# Patient Record
Sex: FEMALE | Race: BLACK OR AFRICAN AMERICAN | NOT HISPANIC OR LATINO | URBAN - METROPOLITAN AREA
[De-identification: names, ages, dates, MRNs, and addresses within clinical notes are randomized per-mention and may not be internally consistent; named-entity substitution may affect disease eponyms.]

---

## 2019-03-15 ENCOUNTER — EMERGENCY (EMERGENCY)
Facility: HOSPITAL | Age: 34
LOS: 1 days | Discharge: HOME | End: 2019-03-15
Attending: EMERGENCY MEDICINE | Admitting: EMERGENCY MEDICINE
Payer: COMMERCIAL

## 2019-03-15 VITALS
HEART RATE: 85 BPM | TEMPERATURE: 99 F | DIASTOLIC BLOOD PRESSURE: 84 MMHG | OXYGEN SATURATION: 100 % | SYSTOLIC BLOOD PRESSURE: 122 MMHG | RESPIRATION RATE: 16 BRPM

## 2019-03-15 DIAGNOSIS — Z98.891 HISTORY OF UTERINE SCAR FROM PREVIOUS SURGERY: Chronic | ICD-10-CM

## 2019-03-15 LAB
ALBUMIN SERPL ELPH-MCNC: 3.7 G/DL — SIGNIFICANT CHANGE UP (ref 3.3–5)
ALP SERPL-CCNC: 52 U/L — SIGNIFICANT CHANGE UP (ref 40–120)
ALT FLD-CCNC: 19 U/L — SIGNIFICANT CHANGE UP (ref 4–33)
ANION GAP SERPL CALC-SCNC: 11 MMO/L — SIGNIFICANT CHANGE UP (ref 7–14)
APTT BLD: 30.8 SEC — SIGNIFICANT CHANGE UP (ref 27.5–36.3)
AST SERPL-CCNC: 17 U/L — SIGNIFICANT CHANGE UP (ref 4–32)
BASOPHILS # BLD AUTO: 0.03 K/UL — SIGNIFICANT CHANGE UP (ref 0–0.2)
BASOPHILS NFR BLD AUTO: 0.4 % — SIGNIFICANT CHANGE UP (ref 0–2)
BILIRUB SERPL-MCNC: < 0.2 MG/DL — LOW (ref 0.2–1.2)
BLD GP AB SCN SERPL QL: NEGATIVE — SIGNIFICANT CHANGE UP
BUN SERPL-MCNC: 10 MG/DL — SIGNIFICANT CHANGE UP (ref 7–23)
CALCIUM SERPL-MCNC: 9.2 MG/DL — SIGNIFICANT CHANGE UP (ref 8.4–10.5)
CHLORIDE SERPL-SCNC: 105 MMOL/L — SIGNIFICANT CHANGE UP (ref 98–107)
CO2 SERPL-SCNC: 22 MMOL/L — SIGNIFICANT CHANGE UP (ref 22–31)
CREAT SERPL-MCNC: 0.77 MG/DL — SIGNIFICANT CHANGE UP (ref 0.5–1.3)
EOSINOPHIL # BLD AUTO: 0.07 K/UL — SIGNIFICANT CHANGE UP (ref 0–0.5)
EOSINOPHIL NFR BLD AUTO: 0.9 % — SIGNIFICANT CHANGE UP (ref 0–6)
GLUCOSE SERPL-MCNC: 90 MG/DL — SIGNIFICANT CHANGE UP (ref 70–99)
HCG SERPL-ACNC: SIGNIFICANT CHANGE UP MIU/ML
HCT VFR BLD CALC: 34 % — LOW (ref 34.5–45)
HGB BLD-MCNC: 9.9 G/DL — LOW (ref 11.5–15.5)
IMM GRANULOCYTES NFR BLD AUTO: 0.4 % — SIGNIFICANT CHANGE UP (ref 0–1.5)
INR BLD: 0.97 — SIGNIFICANT CHANGE UP (ref 0.88–1.17)
LYMPHOCYTES # BLD AUTO: 2.6 K/UL — SIGNIFICANT CHANGE UP (ref 1–3.3)
LYMPHOCYTES # BLD AUTO: 32 % — SIGNIFICANT CHANGE UP (ref 13–44)
MCHC RBC-ENTMCNC: 21.9 PG — LOW (ref 27–34)
MCHC RBC-ENTMCNC: 29.1 % — LOW (ref 32–36)
MCV RBC AUTO: 75.2 FL — LOW (ref 80–100)
MONOCYTES # BLD AUTO: 0.9 K/UL — SIGNIFICANT CHANGE UP (ref 0–0.9)
MONOCYTES NFR BLD AUTO: 11.1 % — SIGNIFICANT CHANGE UP (ref 2–14)
NEUTROPHILS # BLD AUTO: 4.49 K/UL — SIGNIFICANT CHANGE UP (ref 1.8–7.4)
NEUTROPHILS NFR BLD AUTO: 55.2 % — SIGNIFICANT CHANGE UP (ref 43–77)
NRBC # FLD: 0 K/UL — LOW (ref 25–125)
PLATELET # BLD AUTO: 240 K/UL — SIGNIFICANT CHANGE UP (ref 150–400)
PMV BLD: 10.9 FL — SIGNIFICANT CHANGE UP (ref 7–13)
POTASSIUM SERPL-MCNC: 3.9 MMOL/L — SIGNIFICANT CHANGE UP (ref 3.5–5.3)
POTASSIUM SERPL-SCNC: 3.9 MMOL/L — SIGNIFICANT CHANGE UP (ref 3.5–5.3)
PROT SERPL-MCNC: 6.7 G/DL — SIGNIFICANT CHANGE UP (ref 6–8.3)
PROTHROM AB SERPL-ACNC: 11 SEC — SIGNIFICANT CHANGE UP (ref 9.8–13.1)
RBC # BLD: 4.52 M/UL — SIGNIFICANT CHANGE UP (ref 3.8–5.2)
RBC # FLD: 16.4 % — HIGH (ref 10.3–14.5)
RH IG SCN BLD-IMP: POSITIVE — SIGNIFICANT CHANGE UP
SODIUM SERPL-SCNC: 138 MMOL/L — SIGNIFICANT CHANGE UP (ref 135–145)
WBC # BLD: 8.12 K/UL — SIGNIFICANT CHANGE UP (ref 3.8–10.5)
WBC # FLD AUTO: 8.12 K/UL — SIGNIFICANT CHANGE UP (ref 3.8–10.5)

## 2019-03-15 PROCEDURE — 99284 EMERGENCY DEPT VISIT MOD MDM: CPT

## 2019-03-15 PROCEDURE — 76830 TRANSVAGINAL US NON-OB: CPT | Mod: 26

## 2019-03-15 RX ORDER — SODIUM CHLORIDE 9 MG/ML
1000 INJECTION INTRAMUSCULAR; INTRAVENOUS; SUBCUTANEOUS ONCE
Qty: 0 | Refills: 0 | Status: COMPLETED | OUTPATIENT
Start: 2019-03-15 | End: 2019-03-15

## 2019-03-15 RX ADMIN — SODIUM CHLORIDE 1000 MILLILITER(S): 9 INJECTION INTRAMUSCULAR; INTRAVENOUS; SUBCUTANEOUS at 20:03

## 2019-03-15 NOTE — ED PROVIDER NOTE - CLINICAL SUMMARY MEDICAL DECISION MAKING FREE TEXT BOX
34 year old female 4  weeks gestation via IVF (by LMP 19) with a PMHx of iron def anemia PSHx of  pw intermittent lower pelvic cramping/back pain since onset of pregnancy - worse 1 week ago.   Plan: tvus, hcg, labs, IVF

## 2019-03-15 NOTE — ED ADULT TRIAGE NOTE - CHIEF COMPLAINT QUOTE
pt amb to triage r/o ectopic preg, IVF patient conceived date 2/16, US yesterday w/ no IUP, pelvic pain 1 week ago, denies cramping/pelvic pain on presentation, denies vaginal bleeding, HCG 10,000 yesterday

## 2019-03-15 NOTE — ED PROVIDER NOTE - PROGRESS NOTE DETAILS
MARCE Marquez: Pt signed out to me by MARCE Tinsley, pt was sent in to ED by IVF doctor for r/o ectopic as her he had elevated hcg without IUP. TVUS showing IUP at 6 weeks gestation. Discussed results with pt, will d/c home. admin pa to follow ua. Pt will follow up with her OBGYN.

## 2019-03-15 NOTE — ED PROVIDER NOTE - NSFOLLOWUPINSTRUCTIONS_ED_ALL_ED_FT
Follow up with your OBGYN within 1 week  Pelvic rest, do not insert anything inside the vagina  Return to the ER with any worsening or concerning symptoms, abdominal pain, heavy vaginal bleeding, weakness, fever/chills or any other concerns.

## 2019-03-15 NOTE — ED PROVIDER NOTE - OBJECTIVE STATEMENT
34 year old female 4  weeks gestation via IVF (by LMP 19) with a PMHx of iron def anemia PSHx of  pw intermittent lower pelvic cramping/back pain since onset of pregnancy - worse 1 week ago. States she has occasional sharp pelvic pain and has had vaginal spotting a few times during pregnancy. She had an US which showed no IUP and had a beta of 10,000 and was told to come to the ED for further evaluation and r.o ectopic. Denies n/v/f/c, CP, SOB, dysuria, hematuria, melena, hematochezia, diarrhea, vaginal dc/itching.

## 2019-03-15 NOTE — ED PROVIDER NOTE - ATTENDING CONTRIBUTION TO CARE
34 year old female c/o intermittent pelvic and back pain x one week. PE: NAD, abd NTND, pelvic as PA exam. I&P: labs and US WNL, pregnancy, analgesics, OB/GYN follow up

## 2019-03-16 VITALS
RESPIRATION RATE: 16 BRPM | SYSTOLIC BLOOD PRESSURE: 112 MMHG | HEART RATE: 67 BPM | DIASTOLIC BLOOD PRESSURE: 69 MMHG | TEMPERATURE: 99 F | OXYGEN SATURATION: 100 %

## 2019-03-16 LAB
APPEARANCE UR: CLEAR — SIGNIFICANT CHANGE UP
BACTERIA # UR AUTO: NEGATIVE — SIGNIFICANT CHANGE UP
BILIRUB UR-MCNC: NEGATIVE — SIGNIFICANT CHANGE UP
BLOOD UR QL VISUAL: HIGH
COLOR SPEC: SIGNIFICANT CHANGE UP
GLUCOSE UR-MCNC: NEGATIVE — SIGNIFICANT CHANGE UP
HYALINE CASTS # UR AUTO: NEGATIVE — SIGNIFICANT CHANGE UP
KETONES UR-MCNC: NEGATIVE — SIGNIFICANT CHANGE UP
LEUKOCYTE ESTERASE UR-ACNC: NEGATIVE — SIGNIFICANT CHANGE UP
NITRITE UR-MCNC: NEGATIVE — SIGNIFICANT CHANGE UP
PH UR: 7 — SIGNIFICANT CHANGE UP (ref 5–8)
PROT UR-MCNC: NEGATIVE — SIGNIFICANT CHANGE UP
RBC CASTS # UR COMP ASSIST: HIGH (ref 0–?)
SP GR SPEC: 1.01 — SIGNIFICANT CHANGE UP (ref 1–1.04)
SQUAMOUS # UR AUTO: SIGNIFICANT CHANGE UP
UROBILINOGEN FLD QL: NORMAL — SIGNIFICANT CHANGE UP
WBC UR QL: SIGNIFICANT CHANGE UP (ref 0–?)

## 2019-03-16 NOTE — ED ADULT NURSE REASSESSMENT NOTE - NS ED NURSE REASSESS COMMENT FT1
pt A&Ox4, ambulatory, appears in NAD, respirations even and non labored, endorses no complaints at this time. pt discharged, instructions provided. IV removed, vitals as noted.  pt ambulated to ED exit without assist.

## 2019-03-17 LAB
BACTERIA UR CULT: SIGNIFICANT CHANGE UP
SPECIMEN SOURCE: SIGNIFICANT CHANGE UP

## 2019-03-17 NOTE — ED POST DISCHARGE NOTE - RESULT SUMMARY
MARCE Booth: Telephone request from MARCE Marquez to f/u UA and call pt if positive. UA w/ blood and RBC, likely contaminated from vaginal bleeding, no leukocytes or nitrites.

## 2019-04-23 ENCOUNTER — EMERGENCY (EMERGENCY)
Facility: HOSPITAL | Age: 34
LOS: 1 days | Discharge: HOME | End: 2019-04-23
Attending: EMERGENCY MEDICINE | Admitting: EMERGENCY MEDICINE
Payer: COMMERCIAL

## 2019-04-23 VITALS
HEART RATE: 78 BPM | SYSTOLIC BLOOD PRESSURE: 134 MMHG | OXYGEN SATURATION: 100 % | TEMPERATURE: 98 F | RESPIRATION RATE: 18 BRPM | DIASTOLIC BLOOD PRESSURE: 75 MMHG

## 2019-04-23 DIAGNOSIS — Z98.891 HISTORY OF UTERINE SCAR FROM PREVIOUS SURGERY: Chronic | ICD-10-CM

## 2019-04-23 PROCEDURE — 99285 EMERGENCY DEPT VISIT HI MDM: CPT | Mod: 25

## 2019-04-23 NOTE — ED ADULT TRIAGE NOTE - CHIEF COMPLAINT QUOTE
Pt arrives c/o pelvic pain. Took Cytotec on Friday, 2nd round states "it didn't pass the first time", initially took Cytotec @ 8 wks gestation. States pain is worse on R Side and vaginal bleeding, denies saturating pads.  Pt reports hx of IVF, R fallopian tube has scar tissue from prior .  Pt appearing uncomfortable in triage.

## 2019-04-24 VITALS
TEMPERATURE: 99 F | RESPIRATION RATE: 16 BRPM | DIASTOLIC BLOOD PRESSURE: 66 MMHG | OXYGEN SATURATION: 100 % | SYSTOLIC BLOOD PRESSURE: 113 MMHG | HEART RATE: 74 BPM

## 2019-04-24 PROBLEM — D50.9 IRON DEFICIENCY ANEMIA, UNSPECIFIED: Chronic | Status: ACTIVE | Noted: 2019-03-15

## 2019-04-24 LAB
ALBUMIN SERPL ELPH-MCNC: 4 G/DL — SIGNIFICANT CHANGE UP (ref 3.3–5)
ALP SERPL-CCNC: 53 U/L — SIGNIFICANT CHANGE UP (ref 40–120)
ALT FLD-CCNC: 18 U/L — SIGNIFICANT CHANGE UP (ref 4–33)
ANION GAP SERPL CALC-SCNC: 11 MMO/L — SIGNIFICANT CHANGE UP (ref 7–14)
APPEARANCE UR: CLEAR — SIGNIFICANT CHANGE UP
AST SERPL-CCNC: 13 U/L — SIGNIFICANT CHANGE UP (ref 4–32)
BASOPHILS # BLD AUTO: 0.05 K/UL — SIGNIFICANT CHANGE UP (ref 0–0.2)
BASOPHILS NFR BLD AUTO: 0.6 % — SIGNIFICANT CHANGE UP (ref 0–2)
BILIRUB SERPL-MCNC: < 0.2 MG/DL — LOW (ref 0.2–1.2)
BILIRUB UR-MCNC: NEGATIVE — SIGNIFICANT CHANGE UP
BLD GP AB SCN SERPL QL: NEGATIVE — SIGNIFICANT CHANGE UP
BLOOD UR QL VISUAL: NEGATIVE — SIGNIFICANT CHANGE UP
BUN SERPL-MCNC: 7 MG/DL — SIGNIFICANT CHANGE UP (ref 7–23)
CALCIUM SERPL-MCNC: 9.2 MG/DL — SIGNIFICANT CHANGE UP (ref 8.4–10.5)
CHLORIDE SERPL-SCNC: 105 MMOL/L — SIGNIFICANT CHANGE UP (ref 98–107)
CO2 SERPL-SCNC: 25 MMOL/L — SIGNIFICANT CHANGE UP (ref 22–31)
COLOR SPEC: YELLOW — SIGNIFICANT CHANGE UP
CREAT SERPL-MCNC: 0.77 MG/DL — SIGNIFICANT CHANGE UP (ref 0.5–1.3)
EOSINOPHIL # BLD AUTO: 0.14 K/UL — SIGNIFICANT CHANGE UP (ref 0–0.5)
EOSINOPHIL NFR BLD AUTO: 1.6 % — SIGNIFICANT CHANGE UP (ref 0–6)
GLUCOSE SERPL-MCNC: 97 MG/DL — SIGNIFICANT CHANGE UP (ref 70–99)
GLUCOSE UR-MCNC: NEGATIVE — SIGNIFICANT CHANGE UP
HCG SERPL-ACNC: 1106 MIU/ML — SIGNIFICANT CHANGE UP
HCT VFR BLD CALC: 34.5 % — SIGNIFICANT CHANGE UP (ref 34.5–45)
HGB BLD-MCNC: 10.3 G/DL — LOW (ref 11.5–15.5)
IMM GRANULOCYTES NFR BLD AUTO: 0.2 % — SIGNIFICANT CHANGE UP (ref 0–1.5)
KETONES UR-MCNC: SIGNIFICANT CHANGE UP
LEUKOCYTE ESTERASE UR-ACNC: NEGATIVE — SIGNIFICANT CHANGE UP
LYMPHOCYTES # BLD AUTO: 2.79 K/UL — SIGNIFICANT CHANGE UP (ref 1–3.3)
LYMPHOCYTES # BLD AUTO: 31.3 % — SIGNIFICANT CHANGE UP (ref 13–44)
MCHC RBC-ENTMCNC: 22 PG — LOW (ref 27–34)
MCHC RBC-ENTMCNC: 29.9 % — LOW (ref 32–36)
MCV RBC AUTO: 73.6 FL — LOW (ref 80–100)
MONOCYTES # BLD AUTO: 0.99 K/UL — HIGH (ref 0–0.9)
MONOCYTES NFR BLD AUTO: 11.1 % — SIGNIFICANT CHANGE UP (ref 2–14)
NEUTROPHILS # BLD AUTO: 4.91 K/UL — SIGNIFICANT CHANGE UP (ref 1.8–7.4)
NEUTROPHILS NFR BLD AUTO: 55.2 % — SIGNIFICANT CHANGE UP (ref 43–77)
NITRITE UR-MCNC: NEGATIVE — SIGNIFICANT CHANGE UP
NRBC # FLD: 0 K/UL — SIGNIFICANT CHANGE UP (ref 0–0)
PH UR: 6 — SIGNIFICANT CHANGE UP (ref 5–8)
PLATELET # BLD AUTO: 291 K/UL — SIGNIFICANT CHANGE UP (ref 150–400)
PMV BLD: 10.8 FL — SIGNIFICANT CHANGE UP (ref 7–13)
POTASSIUM SERPL-MCNC: 4.1 MMOL/L — SIGNIFICANT CHANGE UP (ref 3.5–5.3)
POTASSIUM SERPL-SCNC: 4.1 MMOL/L — SIGNIFICANT CHANGE UP (ref 3.5–5.3)
PROT SERPL-MCNC: 7.1 G/DL — SIGNIFICANT CHANGE UP (ref 6–8.3)
PROT UR-MCNC: 20 — SIGNIFICANT CHANGE UP
RBC # BLD: 4.69 M/UL — SIGNIFICANT CHANGE UP (ref 3.8–5.2)
RBC # FLD: 15.7 % — HIGH (ref 10.3–14.5)
RH IG SCN BLD-IMP: POSITIVE — SIGNIFICANT CHANGE UP
SODIUM SERPL-SCNC: 141 MMOL/L — SIGNIFICANT CHANGE UP (ref 135–145)
SP GR SPEC: 1.03 — SIGNIFICANT CHANGE UP (ref 1–1.04)
UROBILINOGEN FLD QL: NORMAL — SIGNIFICANT CHANGE UP
WBC # BLD: 8.9 K/UL — SIGNIFICANT CHANGE UP (ref 3.8–10.5)
WBC # FLD AUTO: 8.9 K/UL — SIGNIFICANT CHANGE UP (ref 3.8–10.5)

## 2019-04-24 PROCEDURE — 76830 TRANSVAGINAL US NON-OB: CPT | Mod: 26

## 2019-04-24 RX ORDER — MORPHINE SULFATE 50 MG/1
4 CAPSULE, EXTENDED RELEASE ORAL ONCE
Qty: 0 | Refills: 0 | Status: DISCONTINUED | OUTPATIENT
Start: 2019-04-24 | End: 2019-04-24

## 2019-04-24 RX ORDER — SODIUM CHLORIDE 9 MG/ML
3 INJECTION INTRAMUSCULAR; INTRAVENOUS; SUBCUTANEOUS ONCE
Qty: 0 | Refills: 0 | Status: COMPLETED | OUTPATIENT
Start: 2019-04-24 | End: 2019-04-24

## 2019-04-24 RX ORDER — SODIUM CHLORIDE 9 MG/ML
1000 INJECTION INTRAMUSCULAR; INTRAVENOUS; SUBCUTANEOUS ONCE
Qty: 0 | Refills: 0 | Status: COMPLETED | OUTPATIENT
Start: 2019-04-24 | End: 2019-04-24

## 2019-04-24 RX ORDER — ONDANSETRON 8 MG/1
4 TABLET, FILM COATED ORAL ONCE
Qty: 0 | Refills: 0 | Status: COMPLETED | OUTPATIENT
Start: 2019-04-24 | End: 2019-04-24

## 2019-04-24 RX ADMIN — MORPHINE SULFATE 4 MILLIGRAM(S): 50 CAPSULE, EXTENDED RELEASE ORAL at 01:04

## 2019-04-24 RX ADMIN — SODIUM CHLORIDE 1000 MILLILITER(S): 9 INJECTION INTRAMUSCULAR; INTRAVENOUS; SUBCUTANEOUS at 01:04

## 2019-04-24 RX ADMIN — SODIUM CHLORIDE 3 MILLILITER(S): 9 INJECTION INTRAMUSCULAR; INTRAVENOUS; SUBCUTANEOUS at 01:04

## 2019-04-24 RX ADMIN — ONDANSETRON 4 MILLIGRAM(S): 8 TABLET, FILM COATED ORAL at 01:04

## 2019-04-24 NOTE — CONSULT NOTE ADULT - ATTENDING COMMENTS
Pt seen and examined agree with above resident note.  Pt with known missed AB dx by her private GYN Dr. Miguel pt s/p 2 courses of cytotec.  Today on ultrasound evidence of retained POCS.  Pt already has follow up appointment scheduled with Dr. Miguel on Thursday, pt states she would like to continue care for missed AB with Dr. Miguel.  Discussed with pt likely need for outpatient D+C if products do not pass between now and then.  strict Bleeding and pain precautions given, pt instructed to return to hospital with any heavy bleeding, severe pain, fevers, or chills.  All questions and concerns answered.            Suly Luque MD

## 2019-04-24 NOTE — ED ADULT NURSE NOTE - OBJECTIVE STATEMENT
PT brought into room 5. A&OX4 ambulatory self care female presents to the ed today for right sided pelvic pain starting 2 days ago after taking Cylotec Friday for a miscarriage. PT took cylotec twice due to products not fully removed. Pt reports pain as a stabbing like feeling that is persistent. PT states she has scarring due to csection. Denies pad saturation bleeding. PT speaking in clear and full sentences denies SOB/CP. VSS. Awaiting MD abrams

## 2019-04-24 NOTE — ED ADULT NURSE REASSESSMENT NOTE - NS ED NURSE REASSESS COMMENT FT1
Report received from FABIAN HERNANDEZ Pt a&ox4, resting comfortably in stretcher. VSS. Pt is in NAD at this time. Pending CT scan as well as report for US at this time.

## 2019-04-24 NOTE — ED ADULT NURSE NOTE - NSIMPLEMENTINTERV_GEN_ALL_ED
Implemented All Universal Safety Interventions:  [place] to call system. Call bell, personal items and telephone within reach. Instruct patient to call for assistance. Room bathroom lighting operational. Non-slip footwear when patient is off stretcher. Physically safe environment: no spills, clutter or unnecessary equipment. Stretcher in lowest position, wheels locked, appropriate side rails in place.

## 2019-04-24 NOTE — ED PROVIDER NOTE - CLINICAL SUMMARY MEDICAL DECISION MAKING FREE TEXT BOX
pt with continued and worsening pain since cytotec dosing.  need to be concerned for retained products vs possible appy.  Will get labs including a quant as well as TVUS.  If negative for an etiology will proceed to CT scan to eval for appy.  Aggressive pain control in meantime.

## 2019-04-24 NOTE — ED PROVIDER NOTE - PROGRESS NOTE DETAILS
Myles FRAUSTO: Pt's pain controlled at this time. Still with persistent vaginal bleeding. OBGYN paged awaiting eval in room. OBGYN eval of patient.  No acute intervention at this time.  Will schedule an outpatient D&C with her OBGYN today. Spoke with patient extensively regarding current differential diagnosis for ongoing symptoms, and patient acknowledged understanding. All questions and concerns have been addressed with the patient. I have discussed the plan for care and patient is in agreement. Patient is instructed to follow up with Primary Care Provider, and has been given strict return precautions.

## 2019-04-24 NOTE — CONSULT NOTE ADULT - ASSESSMENT
-f/u with Dr. Miguel on Thursday or sooner if possible to schedule outpatient D&C   -Continue doxycycline course  -Tylenol 650 q6h, Motrin 600 q6h, Percocet PRN severe pain  -If febrile or abdominal pain worsens, call your doctor and go to [facility] ED (as GYN has privileges there)    Pt seen and d/w Dr. Rebel Kirk PGY2 34y , no significant PMH, h/o of a MAB 19, presents with increased abdominal pain and vaginal bleeding s/p 2 courses of cytotec. On exam, minimal vaginal spotting mild RLQ tenderness. Pt Afebrile, VSS, WBC 8.9, H/H 10.3/34.5, bHCG 1106 (previously 34785 on 3/15/19). UA neg TVUS shows no IUP, EM 13mm with flow, suggestive of RPOC. Previously IUP confirmed on TVUS on 3/15/19.     -No acute GYN interventions at this time given mild RLQ tenderness, minimal vaginal bleeding, and no signs or symptoms of septic ab.  -f/u with Dr. Miguel on Thursday or sooner, if possible, to schedule outpatient D&C   -Continue doxycycline course as prescribed.  -Tylenol 650 q6h, Motrin 600 q6h, Percocet PRN severe pain  -If febrile or abdominal pain worsens, call your doctor and go to [facility] ED (as GYN has privileges there)    Pt seen and d/w Dr. Rebel Kirk PGY2

## 2019-04-24 NOTE — ED PROVIDER NOTE - OBJECTIVE STATEMENT
35 yo  with recent IVF with one embryo that lead to a missed Ab at 8 weeks.  Two doses of cytotec the last of which was this  presenting with 1 day of severe constant RLQ/R pelvic pains that also go to the back and in the suprapubic region.  Still with some vaginal spotting and bleeding.  Did not have this R sided pain previously with cytotec dosing.  Low grade fever and nausea at home without vomiting.

## 2019-04-24 NOTE — ED PROVIDER NOTE - PHYSICAL EXAMINATION
Gen: Well appearing in NAD  Head: NC/AT  Neck: trachea midline  Resp:  No distress  Abd: TTP in the RLQ and suprapubic region with voluntary guarding  Ext: no deformities  Neuro:  A&O appears non focal  Skin:  Warm and dry as visualized

## 2019-04-24 NOTE — CONSULT NOTE ADULT - SUBJECTIVE AND OBJECTIVE BOX
R2 GYNECOLOGIC CONSULT NOTE    34y G  P  Last Menstrual Period    presents with    OB/GYN HISTORY:   OBGYN=    Past Medical History:   Iron deficiency anemia  No pertinent past medical history        Surgical History:    H/O:       Medications:      Allergies  penicillin (Unknown)      Social History:     Psych History:    Familiy History:  FAMILY HISTORY:      REVIEW OF SYSTEMS: negative, except stated in HPI    OBJECTIVE FINDINGS:    Vital Signs Last 24 Hrs  T(C): 37.1 (2019 02:30), Max: 37.1 (2019 02:30)  T(F): 98.8 (2019 02:30), Max: 98.8 (2019 02:30)  HR: 74 (2019 02:30) (72 - 78)  BP: 113/66 (2019 02:30) (113/66 - 134/75)  BP(mean): --  RR: 16 (2019 02:30) (16 - 18)  SpO2: 100% (2019 02:30) (100% - 100%)    PHYSICAL EXAM:    GENERAL: NAD, well-developed  HEAD:  Atraumatic, Normocephalic  EYES: EOMI, PERRLA, conjunctiva and sclera clear  ENMT: No tonsillar erythema, exudates, or enlargement; Moist mucous membranes, Good dentition, No lesions  NECK: Supple, No JVD, Normal thyroid  NERVOUS SYSTEM:  Alert & Oriented X3, Good concentration; Motor Strength 5/5 B/L upper and lower extremities; DTRs 2+ intact and symmetric  CHEST/LUNG: Clear to auscultation bilaterally; No rales, rhonchi, wheezing, or rubs  HEART: Regular rate and rhythm; No murmurs, rubs, or gallops  ABDOMEN: Soft, Nontender, Nondistended; Bowel sounds present, No rebound, No guarding  EXTREMITIES:  2+ Peripheral Pulses, No clubbing, cyanosis, or edema, Annette's sign negative  LYMPH: No lymphadenopathy noted  SKIN: No rashes or lesions  PELVIC:   -Inspection: no vulvar or labial lesions, erythema.   -Speculum exam: **vaginal bleeding, **cervix, **vaginal discharge  -Bimanual exam: uterus **, adnexa **  RECTAL: rectovaginal septum thin without nodularity      LABS:                        10.3   8.90  )-----------( 291      ( 2019 01:00 )             34.5     04-    141  |  105  |  7   ----------------------------<  97  4.1   |  25  |  0.77    Ca    9.2      2019 01:00    TPro  7.1  /  Alb  4.0  /  TBili  < 0.2<L>  /  DBili  x   /  AST  13  /  ALT  18  /  AlkPhos  53        Urinalysis Basic - ( 2019 01:00 )    Color: YELLOW / Appearance: CLEAR / S.030 / pH: 6.0  Gluc: NEGATIVE / Ketone: TRACE  / Bili: NEGATIVE / Urobili: NORMAL   Blood: NEGATIVE / Protein: 20 / Nitrite: NEGATIVE   Leuk Esterase: NEGATIVE / RBC: x / WBC x   Sq Epi: x / Non Sq Epi: x / Bacteria: x        RADIOLOGY & ADDITIONAL STUDIES: R2 GYNECOLOGIC CONSULT NOTE    34y , no significant PMH, h/o of a MAB 19, presents with increased abdominal pain and vaginal bleeding s/p 2 courses of cytotec. Pt had a 5d single embryo transfer on 19 and dx with a MAB on 19. Pt was offered expectant mgmt vs. cytotec vs. D&C for the MAB, and opted to medical management with Dr. Miguel (OBGYN@ [facility]). Pt first took cytotec (400mg bucally, 400 mg vaginally) on 4/15. She subsequently experienced abdominal cramping with vaginal bleeding and passage of tissue, gestational sac, clots. Bleeding subsided after a few days, but then returned. She was re-evaluated by Dr. Miguel on , noted to have RPOC on outside US, and rx'd a 2nd course of cytotec (400mg bucally, 400 mg vaginally) and took the medication on . She was also rx'd doxycycline 100 mg BID x 5days, (pt currently day 5), and advised to take Tylenol, Motrin, and rx'd Percocet for severe pain. Pt states shes had some vaginal spotting without passage of additional clot or tissue. The abdominal pain is RLQ>LLQ and is sharp and stabbing in nature. Pt has been taking Tylenol and Motrin intermittently  Pt denies fever, chills, N/V, CP, SOB. Denies dysuria, hematuria, GI complaints.     In ED: Pt Afebrile, VSS, WBC 8.9, H/H 10.3/34.5, bHCG 1106 (previously 93640 on 3/15/19). UA neg TVUS shows no IUP, EM 13mm with flow, suggestive of RPOC.  Of note, previous IUP confirmed on 3/15/19.    OB/GYN HISTORY:   [facility]N=[facility] (affiliated with [facility])  G1-pLTCS for arrest of dil   G2-chem pregnancy 2016  denies fibroids, ovarian cysts, abl paps, h/o STIs    Past Medical History:   Iron deficiency anemia        Surgical History:    H/O:       Medications:  Fe  Tylenol  Percocet      Allergies  penicillin (rash)      Social History: denies    Psych History: denies        REVIEW OF SYSTEMS: negative, except stated in HPI    OBJECTIVE FINDINGS:    Vital Signs Last 24 Hrs  T(C): 37.1 (2019 02:30), Max: 37.1 (2019 02:30)  T(F): 98.8 (2019 02:30), Max: 98.8 (2019 02:30)  HR: 74 (2019 02:30) (72 - 78)  BP: 113/66 (2019 02:30) (113/66 - 134/75)  BP(mean): --  RR: 16 (2019 02:30) (16 - 18)  SpO2: 100% (2019 02:30) (100% - 100%)    PHYSICAL EXAM:    GENERAL: NAD, well-developed  ABDOMEN: mild RLQ pain to deep palpation, Soft, Nontender, Nondistended; No rebound, No guarding  EXTREMITIES:  2+ Peripheral Pulses, No clubbing, cyanosis, or edema, Annette's sign negative  PELVIC:   -Inspection: no vulvar or labial lesions, erythema.   -Speculum exam: scant vaginal bleeding, cervix appears closed, novaginal discharge  -Bimanual exam: uterus anteverted, approx 10 week size, adnexa nontender, nonpalpable b/l      LABS:                        10.3   8.90  )-----------( 291      ( 2019 01:00 )             34.5     04-24    141  |  105  |  7   ----------------------------<  97  4.1   |  25  |  0.77    Ca    9.2      2019 01:00    TPro  7.1  /  Alb  4.0  /  TBili  < 0.2<L>  /  DBili  x   /  AST  13  /  ALT  18  /  AlkPhos  53  04-24      Urinalysis Basic - ( 2019 01:00 )    Color: YELLOW / Appearance: CLEAR / S.030 / pH: 6.0  Gluc: NEGATIVE / Ketone: TRACE  / Bili: NEGATIVE / Urobili: NORMAL   Blood: NEGATIVE / Protein: 20 / Nitrite: NEGATIVE   Leuk Esterase: NEGATIVE / RBC: x / WBC x   Sq Epi: x / Non Sq Epi: x / Bacteria: x        RADIOLOGY & ADDITIONAL STUDIES:

## 2019-10-28 ENCOUNTER — RESULT REVIEW (OUTPATIENT)
Age: 34
End: 2019-10-28

## 2020-08-25 PROBLEM — Z00.00 ENCOUNTER FOR PREVENTIVE HEALTH EXAMINATION: Status: ACTIVE | Noted: 2020-08-25

## 2020-08-28 ENCOUNTER — ASOB RESULT (OUTPATIENT)
Age: 35
End: 2020-08-28

## 2020-08-28 ENCOUNTER — APPOINTMENT (OUTPATIENT)
Dept: ANTEPARTUM | Facility: CLINIC | Age: 35
End: 2020-08-28
Payer: COMMERCIAL

## 2020-08-28 VITALS
DIASTOLIC BLOOD PRESSURE: 70 MMHG | BODY MASS INDEX: 35.08 KG/M2 | WEIGHT: 198 LBS | HEART RATE: 90 BPM | HEIGHT: 63 IN | SYSTOLIC BLOOD PRESSURE: 113 MMHG

## 2020-08-28 PROCEDURE — 99242 OFF/OP CONSLTJ NEW/EST SF 20: CPT | Mod: 25

## 2020-08-28 PROCEDURE — 76805 OB US >/= 14 WKS SNGL FETUS: CPT

## 2020-09-25 ENCOUNTER — ASOB RESULT (OUTPATIENT)
Age: 35
End: 2020-09-25

## 2020-09-25 ENCOUNTER — APPOINTMENT (OUTPATIENT)
Dept: ANTEPARTUM | Facility: CLINIC | Age: 35
End: 2020-09-25
Payer: COMMERCIAL

## 2020-09-25 PROCEDURE — 76811 OB US DETAILED SNGL FETUS: CPT

## 2020-09-25 PROCEDURE — 76817 TRANSVAGINAL US OBSTETRIC: CPT

## 2020-10-05 ENCOUNTER — ASOB RESULT (OUTPATIENT)
Age: 35
End: 2020-10-05

## 2020-10-05 ENCOUNTER — APPOINTMENT (OUTPATIENT)
Dept: ANTEPARTUM | Facility: CLINIC | Age: 35
End: 2020-10-05
Payer: COMMERCIAL

## 2020-10-05 PROCEDURE — 76825 ECHO EXAM OF FETAL HEART: CPT

## 2020-10-05 PROCEDURE — 76827 ECHO EXAM OF FETAL HEART: CPT

## 2020-10-05 PROCEDURE — 93325 DOPPLER ECHO COLOR FLOW MAPG: CPT

## 2020-10-23 ENCOUNTER — APPOINTMENT (OUTPATIENT)
Dept: ANTEPARTUM | Facility: CLINIC | Age: 35
End: 2020-10-23
Payer: COMMERCIAL

## 2020-10-23 ENCOUNTER — ASOB RESULT (OUTPATIENT)
Age: 35
End: 2020-10-23

## 2020-10-23 PROCEDURE — 76816 OB US FOLLOW-UP PER FETUS: CPT

## 2020-10-23 PROCEDURE — 99072 ADDL SUPL MATRL&STAF TM PHE: CPT

## 2020-11-20 ENCOUNTER — APPOINTMENT (OUTPATIENT)
Dept: ANTEPARTUM | Facility: CLINIC | Age: 35
End: 2020-11-20
Payer: COMMERCIAL

## 2020-11-20 ENCOUNTER — ASOB RESULT (OUTPATIENT)
Age: 35
End: 2020-11-20

## 2020-11-20 PROCEDURE — 76816 OB US FOLLOW-UP PER FETUS: CPT

## 2020-12-18 ENCOUNTER — APPOINTMENT (OUTPATIENT)
Dept: ANTEPARTUM | Facility: CLINIC | Age: 35
End: 2020-12-18
Payer: COMMERCIAL

## 2020-12-18 ENCOUNTER — ASOB RESULT (OUTPATIENT)
Age: 35
End: 2020-12-18

## 2020-12-18 PROCEDURE — 99072 ADDL SUPL MATRL&STAF TM PHE: CPT

## 2020-12-18 PROCEDURE — 76816 OB US FOLLOW-UP PER FETUS: CPT

## 2020-12-22 ENCOUNTER — ASOB RESULT (OUTPATIENT)
Age: 35
End: 2020-12-22

## 2020-12-22 ENCOUNTER — APPOINTMENT (OUTPATIENT)
Dept: ANTEPARTUM | Facility: CLINIC | Age: 35
End: 2020-12-22
Payer: COMMERCIAL

## 2020-12-22 PROCEDURE — 99072 ADDL SUPL MATRL&STAF TM PHE: CPT

## 2020-12-22 PROCEDURE — 76818 FETAL BIOPHYS PROFILE W/NST: CPT

## 2020-12-29 ENCOUNTER — APPOINTMENT (OUTPATIENT)
Dept: ANTEPARTUM | Facility: CLINIC | Age: 35
End: 2020-12-29
Payer: COMMERCIAL

## 2020-12-29 ENCOUNTER — ASOB RESULT (OUTPATIENT)
Age: 35
End: 2020-12-29

## 2020-12-29 PROCEDURE — 99072 ADDL SUPL MATRL&STAF TM PHE: CPT

## 2020-12-29 PROCEDURE — 76818 FETAL BIOPHYS PROFILE W/NST: CPT

## 2021-01-05 ENCOUNTER — APPOINTMENT (OUTPATIENT)
Dept: ANTEPARTUM | Facility: CLINIC | Age: 36
End: 2021-01-05
Payer: COMMERCIAL

## 2021-01-05 ENCOUNTER — ASOB RESULT (OUTPATIENT)
Age: 36
End: 2021-01-05

## 2021-01-05 DIAGNOSIS — O99.119 OTHER DISEASES OF THE BLOOD AND BLOOD-FORMING ORGANS AND CERTAIN DISORDERS INVOLVING THE IMMUNE MECHANISM COMPLICATING PREGNANCY, UNSPECIFIED TRIMESTER: ICD-10-CM

## 2021-01-05 DIAGNOSIS — D68.61 OTHER DISEASES OF THE BLOOD AND BLOOD-FORMING ORGANS AND CERTAIN DISORDERS INVOLVING THE IMMUNE MECHANISM COMPLICATING PREGNANCY, UNSPECIFIED TRIMESTER: ICD-10-CM

## 2021-01-05 PROCEDURE — 99072 ADDL SUPL MATRL&STAF TM PHE: CPT

## 2021-01-05 PROCEDURE — 76818 FETAL BIOPHYS PROFILE W/NST: CPT

## 2021-01-05 RX ORDER — HEPARIN SODIUM 5000 [USP'U]/.5ML
5000 INJECTION, SOLUTION INTRAVENOUS; SUBCUTANEOUS
Qty: 150 | Refills: 1 | Status: ACTIVE | COMMUNITY
Start: 2021-01-05 | End: 1900-01-01

## 2021-01-12 ENCOUNTER — ASOB RESULT (OUTPATIENT)
Age: 36
End: 2021-01-12

## 2021-01-12 ENCOUNTER — APPOINTMENT (OUTPATIENT)
Dept: ANTEPARTUM | Facility: CLINIC | Age: 36
End: 2021-01-12
Payer: COMMERCIAL

## 2021-01-12 PROCEDURE — 99072 ADDL SUPL MATRL&STAF TM PHE: CPT

## 2021-01-12 PROCEDURE — 76816 OB US FOLLOW-UP PER FETUS: CPT

## 2021-01-12 PROCEDURE — 76818 FETAL BIOPHYS PROFILE W/NST: CPT

## 2021-01-15 ENCOUNTER — APPOINTMENT (OUTPATIENT)
Dept: ANTEPARTUM | Facility: CLINIC | Age: 36
End: 2021-01-15

## 2021-01-19 ENCOUNTER — ASOB RESULT (OUTPATIENT)
Age: 36
End: 2021-01-19

## 2021-01-19 ENCOUNTER — APPOINTMENT (OUTPATIENT)
Dept: ANTEPARTUM | Facility: CLINIC | Age: 36
End: 2021-01-19
Payer: COMMERCIAL

## 2021-01-19 PROCEDURE — 99072 ADDL SUPL MATRL&STAF TM PHE: CPT

## 2021-01-19 PROCEDURE — 76818 FETAL BIOPHYS PROFILE W/NST: CPT

## 2021-01-26 ENCOUNTER — ASOB RESULT (OUTPATIENT)
Age: 36
End: 2021-01-26

## 2021-01-26 ENCOUNTER — APPOINTMENT (OUTPATIENT)
Dept: ANTEPARTUM | Facility: CLINIC | Age: 36
End: 2021-01-26
Payer: COMMERCIAL

## 2021-01-26 PROCEDURE — 99072 ADDL SUPL MATRL&STAF TM PHE: CPT

## 2021-01-26 PROCEDURE — 76818 FETAL BIOPHYS PROFILE W/NST: CPT

## 2021-01-29 ENCOUNTER — OUTPATIENT (OUTPATIENT)
Dept: OUTPATIENT SERVICES | Facility: HOSPITAL | Age: 36
LOS: 1 days | End: 2021-01-29
Payer: COMMERCIAL

## 2021-01-29 ENCOUNTER — APPOINTMENT (OUTPATIENT)
Dept: ANTEPARTUM | Facility: CLINIC | Age: 36
End: 2021-01-29
Payer: COMMERCIAL

## 2021-01-29 ENCOUNTER — ASOB RESULT (OUTPATIENT)
Age: 36
End: 2021-01-29

## 2021-01-29 DIAGNOSIS — Z3A.39 39 WEEKS GESTATION OF PREGNANCY: ICD-10-CM

## 2021-01-29 DIAGNOSIS — Z01.818 ENCOUNTER FOR OTHER PREPROCEDURAL EXAMINATION: ICD-10-CM

## 2021-01-29 DIAGNOSIS — Z98.891 HISTORY OF UTERINE SCAR FROM PREVIOUS SURGERY: Chronic | ICD-10-CM

## 2021-01-29 LAB
ANION GAP SERPL CALC-SCNC: 8 MMOL/L — SIGNIFICANT CHANGE UP (ref 5–17)
APTT BLD: 30.8 SEC — SIGNIFICANT CHANGE UP (ref 27.5–35.5)
BLD GP AB SCN SERPL QL: SIGNIFICANT CHANGE UP
BUN SERPL-MCNC: 5 MG/DL — LOW (ref 7–18)
CALCIUM SERPL-MCNC: 8.9 MG/DL — SIGNIFICANT CHANGE UP (ref 8.4–10.5)
CHLORIDE SERPL-SCNC: 105 MMOL/L — SIGNIFICANT CHANGE UP (ref 96–108)
CO2 SERPL-SCNC: 25 MMOL/L — SIGNIFICANT CHANGE UP (ref 22–31)
CREAT SERPL-MCNC: 0.51 MG/DL — SIGNIFICANT CHANGE UP (ref 0.5–1.3)
GLUCOSE SERPL-MCNC: 80 MG/DL — SIGNIFICANT CHANGE UP (ref 70–99)
HCT VFR BLD CALC: 30.5 % — LOW (ref 34.5–45)
HGB BLD-MCNC: 9.5 G/DL — LOW (ref 11.5–15.5)
INR BLD: 1.02 RATIO — SIGNIFICANT CHANGE UP (ref 0.88–1.16)
MCHC RBC-ENTMCNC: 23.8 PG — LOW (ref 27–34)
MCHC RBC-ENTMCNC: 31.1 GM/DL — LOW (ref 32–36)
MCV RBC AUTO: 76.4 FL — LOW (ref 80–100)
NRBC # BLD: 0 /100 WBCS — SIGNIFICANT CHANGE UP (ref 0–0)
PLATELET # BLD AUTO: 229 K/UL — SIGNIFICANT CHANGE UP (ref 150–400)
POTASSIUM SERPL-MCNC: 3.8 MMOL/L — SIGNIFICANT CHANGE UP (ref 3.5–5.3)
POTASSIUM SERPL-SCNC: 3.8 MMOL/L — SIGNIFICANT CHANGE UP (ref 3.5–5.3)
PROTHROM AB SERPL-ACNC: 12.1 SEC — SIGNIFICANT CHANGE UP (ref 10.6–13.6)
RBC # BLD: 3.99 M/UL — SIGNIFICANT CHANGE UP (ref 3.8–5.2)
RBC # FLD: 15.6 % — HIGH (ref 10.3–14.5)
SARS-COV-2 RNA SPEC QL NAA+PROBE: SIGNIFICANT CHANGE UP
SODIUM SERPL-SCNC: 138 MMOL/L — SIGNIFICANT CHANGE UP (ref 135–145)
WBC # BLD: 8.84 K/UL — SIGNIFICANT CHANGE UP (ref 3.8–10.5)
WBC # FLD AUTO: 8.84 K/UL — SIGNIFICANT CHANGE UP (ref 3.8–10.5)

## 2021-01-29 PROCEDURE — 99072 ADDL SUPL MATRL&STAF TM PHE: CPT

## 2021-01-29 PROCEDURE — G0463: CPT

## 2021-01-29 PROCEDURE — 76818 FETAL BIOPHYS PROFILE W/NST: CPT

## 2021-01-30 LAB — T PALLIDUM AB TITR SER: NEGATIVE — SIGNIFICANT CHANGE UP

## 2021-01-31 ENCOUNTER — TRANSCRIPTION ENCOUNTER (OUTPATIENT)
Age: 36
End: 2021-01-31

## 2021-02-01 ENCOUNTER — INPATIENT (INPATIENT)
Facility: HOSPITAL | Age: 36
LOS: 1 days | Discharge: HOME | End: 2021-02-03
Attending: OBSTETRICS & GYNECOLOGY | Admitting: OBSTETRICS & GYNECOLOGY
Payer: COMMERCIAL

## 2021-02-01 ENCOUNTER — RESULT REVIEW (OUTPATIENT)
Age: 36
End: 2021-02-01

## 2021-02-01 VITALS — WEIGHT: 207.23 LBS | HEIGHT: 63 IN

## 2021-02-01 DIAGNOSIS — Z3A.39 39 WEEKS GESTATION OF PREGNANCY: ICD-10-CM

## 2021-02-01 DIAGNOSIS — Z98.891 HISTORY OF UTERINE SCAR FROM PREVIOUS SURGERY: Chronic | ICD-10-CM

## 2021-02-01 LAB
SARS-COV-2 IGG SERPL QL IA: NEGATIVE — SIGNIFICANT CHANGE UP
SARS-COV-2 IGM SERPL IA-ACNC: 0.09 INDEX — SIGNIFICANT CHANGE UP

## 2021-02-01 PROCEDURE — 59514 CESAREAN DELIVERY ONLY: CPT | Mod: AS,U9

## 2021-02-01 PROCEDURE — 88307 TISSUE EXAM BY PATHOLOGIST: CPT | Mod: 26

## 2021-02-01 RX ORDER — FERROUS SULFATE 325(65) MG
325 TABLET ORAL DAILY
Refills: 0 | Status: DISCONTINUED | OUTPATIENT
Start: 2021-02-01 | End: 2021-02-03

## 2021-02-01 RX ORDER — TETANUS TOXOID, REDUCED DIPHTHERIA TOXOID AND ACELLULAR PERTUSSIS VACCINE, ADSORBED 5; 2.5; 8; 8; 2.5 [IU]/.5ML; [IU]/.5ML; UG/.5ML; UG/.5ML; UG/.5ML
0.5 SUSPENSION INTRAMUSCULAR ONCE
Refills: 0 | Status: DISCONTINUED | OUTPATIENT
Start: 2021-02-01 | End: 2021-02-03

## 2021-02-01 RX ORDER — ONDANSETRON 8 MG/1
4 TABLET, FILM COATED ORAL EVERY 6 HOURS
Refills: 0 | Status: DISCONTINUED | OUTPATIENT
Start: 2021-02-01 | End: 2021-02-03

## 2021-02-01 RX ORDER — FOLIC ACID 0.8 MG
1 TABLET ORAL DAILY
Refills: 0 | Status: DISCONTINUED | OUTPATIENT
Start: 2021-02-01 | End: 2021-02-03

## 2021-02-01 RX ORDER — IBUPROFEN 200 MG
600 TABLET ORAL EVERY 6 HOURS
Refills: 0 | Status: COMPLETED | OUTPATIENT
Start: 2021-02-01 | End: 2021-12-31

## 2021-02-01 RX ORDER — FAMOTIDINE 10 MG/ML
20 INJECTION INTRAVENOUS ONCE
Refills: 0 | Status: COMPLETED | OUTPATIENT
Start: 2021-02-01 | End: 2021-02-01

## 2021-02-01 RX ORDER — DIPHENHYDRAMINE HCL 50 MG
25 CAPSULE ORAL EVERY 6 HOURS
Refills: 0 | Status: COMPLETED | OUTPATIENT
Start: 2021-02-01 | End: 2021-12-31

## 2021-02-01 RX ORDER — OXYTOCIN 10 UNIT/ML
333.33 VIAL (ML) INJECTION
Qty: 20 | Refills: 0 | Status: DISCONTINUED | OUTPATIENT
Start: 2021-02-01 | End: 2021-02-03

## 2021-02-01 RX ORDER — METOCLOPRAMIDE HCL 10 MG
10 TABLET ORAL ONCE
Refills: 0 | Status: DISCONTINUED | OUTPATIENT
Start: 2021-02-01 | End: 2021-02-01

## 2021-02-01 RX ORDER — DIPHENHYDRAMINE HCL 50 MG
25 CAPSULE ORAL EVERY 6 HOURS
Refills: 0 | Status: DISCONTINUED | OUTPATIENT
Start: 2021-02-01 | End: 2021-02-03

## 2021-02-01 RX ORDER — ENOXAPARIN SODIUM 100 MG/ML
40 INJECTION SUBCUTANEOUS
Refills: 0 | Status: DISCONTINUED | OUTPATIENT
Start: 2021-02-02 | End: 2021-02-03

## 2021-02-01 RX ORDER — ACETAMINOPHEN 500 MG
975 TABLET ORAL
Refills: 0 | Status: DISCONTINUED | OUTPATIENT
Start: 2021-02-01 | End: 2021-02-03

## 2021-02-01 RX ORDER — KETOROLAC TROMETHAMINE 30 MG/ML
30 SYRINGE (ML) INJECTION EVERY 6 HOURS
Refills: 0 | Status: DISCONTINUED | OUTPATIENT
Start: 2021-02-01 | End: 2021-02-02

## 2021-02-01 RX ORDER — LANOLIN
1 OINTMENT (GRAM) TOPICAL EVERY 6 HOURS
Refills: 0 | Status: DISCONTINUED | OUTPATIENT
Start: 2021-02-01 | End: 2021-02-03

## 2021-02-01 RX ORDER — GENTAMICIN SULFATE 40 MG/ML
80 VIAL (ML) INJECTION ONCE
Refills: 0 | Status: COMPLETED | OUTPATIENT
Start: 2021-02-01 | End: 2021-02-01

## 2021-02-01 RX ORDER — CITRIC ACID/SODIUM CITRATE 300-500 MG
30 SOLUTION, ORAL ORAL ONCE
Refills: 0 | Status: COMPLETED | OUTPATIENT
Start: 2021-02-01 | End: 2021-02-01

## 2021-02-01 RX ORDER — SODIUM CHLORIDE 9 MG/ML
1000 INJECTION, SOLUTION INTRAVENOUS ONCE
Refills: 0 | Status: COMPLETED | OUTPATIENT
Start: 2021-02-01 | End: 2021-02-01

## 2021-02-01 RX ORDER — SODIUM CHLORIDE 9 MG/ML
1000 INJECTION, SOLUTION INTRAVENOUS
Refills: 0 | Status: DISCONTINUED | OUTPATIENT
Start: 2021-02-01 | End: 2021-02-01

## 2021-02-01 RX ORDER — SODIUM CHLORIDE 9 MG/ML
1000 INJECTION, SOLUTION INTRAVENOUS
Refills: 0 | Status: DISCONTINUED | OUTPATIENT
Start: 2021-02-01 | End: 2021-02-03

## 2021-02-01 RX ORDER — NALOXONE HYDROCHLORIDE 4 MG/.1ML
0.1 SPRAY NASAL
Refills: 0 | Status: DISCONTINUED | OUTPATIENT
Start: 2021-02-01 | End: 2021-02-03

## 2021-02-01 RX ORDER — ACETAMINOPHEN 500 MG
1000 TABLET ORAL ONCE
Refills: 0 | Status: COMPLETED | OUTPATIENT
Start: 2021-02-01 | End: 2021-02-01

## 2021-02-01 RX ORDER — MORPHINE SULFATE 50 MG/1
0.2 CAPSULE, EXTENDED RELEASE ORAL ONCE
Refills: 0 | Status: DISCONTINUED | OUTPATIENT
Start: 2021-02-01 | End: 2021-02-03

## 2021-02-01 RX ORDER — OXYCODONE HYDROCHLORIDE 5 MG/1
5 TABLET ORAL ONCE
Refills: 0 | Status: DISCONTINUED | OUTPATIENT
Start: 2021-02-01 | End: 2021-02-03

## 2021-02-01 RX ORDER — DEXAMETHASONE 0.5 MG/5ML
4 ELIXIR ORAL EVERY 6 HOURS
Refills: 0 | Status: DISCONTINUED | OUTPATIENT
Start: 2021-02-01 | End: 2021-02-03

## 2021-02-01 RX ORDER — MAGNESIUM HYDROXIDE 400 MG/1
30 TABLET, CHEWABLE ORAL
Refills: 0 | Status: DISCONTINUED | OUTPATIENT
Start: 2021-02-01 | End: 2021-02-03

## 2021-02-01 RX ORDER — OXYCODONE HYDROCHLORIDE 5 MG/1
5 TABLET ORAL
Refills: 0 | Status: DISCONTINUED | OUTPATIENT
Start: 2021-02-01 | End: 2021-02-03

## 2021-02-01 RX ORDER — SIMETHICONE 80 MG/1
80 TABLET, CHEWABLE ORAL EVERY 4 HOURS
Refills: 0 | Status: DISCONTINUED | OUTPATIENT
Start: 2021-02-01 | End: 2021-02-03

## 2021-02-01 RX ADMIN — FAMOTIDINE 20 MILLIGRAM(S): 10 INJECTION INTRAVENOUS at 14:27

## 2021-02-01 RX ADMIN — Medication 975 MILLIGRAM(S): at 21:41

## 2021-02-01 RX ADMIN — Medication 1000 MILLIUNIT(S)/MIN: at 16:17

## 2021-02-01 RX ADMIN — SODIUM CHLORIDE 2000 MILLILITER(S): 9 INJECTION, SOLUTION INTRAVENOUS at 14:16

## 2021-02-01 RX ADMIN — Medication 400 MILLIGRAM(S): at 17:42

## 2021-02-01 RX ADMIN — SODIUM CHLORIDE 125 MILLILITER(S): 9 INJECTION, SOLUTION INTRAVENOUS at 14:28

## 2021-02-01 RX ADMIN — Medication 204 MILLIGRAM(S): at 14:29

## 2021-02-01 RX ADMIN — Medication 30 MILLIGRAM(S): at 16:25

## 2021-02-01 RX ADMIN — Medication 100 MILLIGRAM(S): at 14:16

## 2021-02-01 RX ADMIN — SODIUM CHLORIDE 125 MILLILITER(S): 9 INJECTION, SOLUTION INTRAVENOUS at 17:01

## 2021-02-01 RX ADMIN — Medication 30 MILLILITER(S): at 14:27

## 2021-02-01 RX ADMIN — Medication 25 MILLIGRAM(S): at 22:56

## 2021-02-01 NOTE — PATIENT PROFILE OB - RUPTURE OF MEMBRANES_DATE TIME
Airway patent, Nasal mucosa clear. Mouth with normal mucosa. Throat has no vesicles, no oropharyngeal exudates and uvula is midline.
01-Feb-2021 15:16

## 2021-02-01 NOTE — CHART NOTE - NSCHARTNOTEFT_GEN_A_CORE
TYLER ZHENG Event Note     Pt without complaints, comfortable    T(C): 36.8 (02-01-21 @ 19:10), Max: 36.8 (02-01-21 @ 16:09)  HR: 85 (02-01-21 @ 19:10) (75 - 87)  BP: 105/57 (02-01-21 @ 19:10) (100/58 - 115/55)  RR: 22 (02-01-21 @ 19:10) (18 - 24)  SpO2: 100% (02-01-21 @ 19:10) (99% - 100%)  Wt(kg): --    Gen: A&Ox 3, NAD  Chest: CTA B/L  Cardiac: S1+S2; RRR  Abdomen: +BS; Soft, nontender, ND; Fundus firm below umbilicus  Gyn: Min lochia  Ext: Nontender, DTRS 2+, no worsening edema        A/P: 36y year old Female s/p rpt c/s at 39wks ELMO  +RAFFI, pt has lovenox for 6 weeks pp  routine postop care   demertia

## 2021-02-02 ENCOUNTER — TRANSCRIPTION ENCOUNTER (OUTPATIENT)
Age: 36
End: 2021-02-02

## 2021-02-02 DIAGNOSIS — D64.9 ANEMIA, UNSPECIFIED: ICD-10-CM

## 2021-02-02 LAB
HCT VFR BLD CALC: 24.6 % — LOW (ref 34.5–45)
HGB BLD-MCNC: 7.6 G/DL — LOW (ref 11.5–15.5)
MCHC RBC-ENTMCNC: 23.7 PG — LOW (ref 27–34)
MCHC RBC-ENTMCNC: 30.9 GM/DL — LOW (ref 32–36)
MCV RBC AUTO: 76.6 FL — LOW (ref 80–100)
NRBC # BLD: 0 /100 WBCS — SIGNIFICANT CHANGE UP (ref 0–0)
PLATELET # BLD AUTO: 190 K/UL — SIGNIFICANT CHANGE UP (ref 150–400)
RBC # BLD: 3.21 M/UL — LOW (ref 3.8–5.2)
RBC # FLD: 15.1 % — HIGH (ref 10.3–14.5)
WBC # BLD: 11.52 K/UL — HIGH (ref 3.8–10.5)
WBC # FLD AUTO: 11.52 K/UL — HIGH (ref 3.8–10.5)

## 2021-02-02 RX ORDER — IRON SUCROSE 20 MG/ML
100 INJECTION, SOLUTION INTRAVENOUS EVERY 24 HOURS
Refills: 0 | Status: COMPLETED | OUTPATIENT
Start: 2021-02-02 | End: 2021-02-03

## 2021-02-02 RX ORDER — IBUPROFEN 200 MG
600 TABLET ORAL EVERY 6 HOURS
Refills: 0 | Status: DISCONTINUED | OUTPATIENT
Start: 2021-02-02 | End: 2021-02-03

## 2021-02-02 RX ADMIN — Medication 1 MILLIGRAM(S): at 13:07

## 2021-02-02 RX ADMIN — Medication 975 MILLIGRAM(S): at 02:32

## 2021-02-02 RX ADMIN — IRON SUCROSE 210 MILLIGRAM(S): 20 INJECTION, SOLUTION INTRAVENOUS at 13:07

## 2021-02-02 RX ADMIN — Medication 30 MILLIGRAM(S): at 19:09

## 2021-02-02 RX ADMIN — Medication 30 MILLIGRAM(S): at 13:07

## 2021-02-02 RX ADMIN — Medication 30 MILLIGRAM(S): at 05:28

## 2021-02-02 RX ADMIN — Medication 975 MILLIGRAM(S): at 08:42

## 2021-02-02 RX ADMIN — Medication 25 MILLIGRAM(S): at 13:14

## 2021-02-02 RX ADMIN — Medication 975 MILLIGRAM(S): at 16:35

## 2021-02-02 RX ADMIN — Medication 975 MILLIGRAM(S): at 21:39

## 2021-02-02 RX ADMIN — Medication 1 TABLET(S): at 13:07

## 2021-02-02 RX ADMIN — Medication 600 MILLIGRAM(S): at 23:51

## 2021-02-02 RX ADMIN — ENOXAPARIN SODIUM 40 MILLIGRAM(S): 100 INJECTION SUBCUTANEOUS at 05:12

## 2021-02-02 NOTE — DISCHARGE NOTE OB - MEDICATION SUMMARY - MEDICATIONS TO TAKE
I will START or STAY ON the medications listed below when I get home from the hospital:    ibuprofen 600 mg oral tablet  -- 1 tab(s) by mouth every 6 hours  -- Indication: For pain    ferrous sulfate 325 mg (65 mg elemental iron) oral tablet  -- 1 tab(s) by mouth 2 times a day   -- Indication: For Anemia    Prenatal Multivitamins with Folic Acid 1 mg oral tablet  -- 1 tab(s) by mouth once a day  -- Indication: For supplementation    Colace 100 mg oral capsule  -- 1 cap(s) by mouth once a day   -- Medication should be taken with plenty of water.    -- Indication: For Constipation

## 2021-02-02 NOTE — PROGRESS NOTE ADULT - COVID-19 NEGATIVE LAB RESULT
Patient is scheduled for a right shoulder arthroscopy and capsular release 06/02/20.  Message left on his voicemail to call the clinic to review post-op expectations.   COVID-19 ruled out

## 2021-02-02 NOTE — DISCHARGE NOTE OB - CARE PLAN
Principal Discharge DX:	 delivery delivered  Goal:	post op care and pain management  Assessment and plan of treatment:	Continue breastfeeding.  Motrin as needed for pain.  Ambulate daily.  No heavy lifting or anything per vagina x 6 weeks - no sex, tampons, douching, tub baths, etc.  Follow up in office in 2 weeks for incision check, and then at 6 weeks for postpartum check.  Secondary Diagnosis:	Acute on chronic anemia  Goal:	take iron, folic acid, vitamin C, and prenatal vitamins. eat iron fortified food  Assessment and plan of treatment:	take iron, folic acid, vitamin C, and prenatal vitamins. eat iron fortified food

## 2021-02-02 NOTE — DISCHARGE NOTE OB - CARE PROVIDER_API CALL
Anthony Alves (DO)  Obstetrics and Gynecology  [address] [place] [address]  Phone: [phone]  Fax: [phone]  Follow Up Time: 1 week

## 2021-02-02 NOTE — DISCHARGE NOTE OB - PLAN OF CARE
post op care and pain management take iron, folic acid, vitamin C, and prenatal vitamins. eat iron fortified food Continue breastfeeding.  Motrin as needed for pain.  Ambulate daily.  No heavy lifting or anything per vagina x 6 weeks - no sex, tampons, douching, tub baths, etc.  Follow up in office in 2 weeks for incision check, and then at 6 weeks for postpartum check.

## 2021-02-02 NOTE — DISCHARGE NOTE OB - PATIENT PORTAL LINK FT
You can access the FollowMyHealth Patient Portal offered by [facility] by registering at the following website: [email or web address]. By joining [facility]’s FollowMyHealth portal, you will also be able to view your health information using other applications (apps) compatible with our system.

## 2021-02-02 NOTE — DISCHARGE NOTE OB - HOSPITAL COURSE
35 y/o s/p scheduled repeat  section @ 39 weeks, lysis of adhesions   normal course of delivery and post op care, pt stable   antiphospholipid syndrome -- to continue lovenox for 6 weeks post partum  covid negative

## 2021-02-02 NOTE — DISCHARGE NOTE OB - MATERIALS PROVIDED
Vaccinations/[facility]  Screening Program/  Immunization Record/Breastfeeding Log/Bottle Feeding Log/Breastfeeding Mother’s Support Group Information/Guide to Postpartum Care/[facility] Hearing Screen Program/Back To Sleep Handout/Breastfeeding Guide and Packet/Birth Certificate Instructions/Discharge Medication Information for Patients and Families Pocket Guide/Tdap Vaccination (VIS Pub Date: 2012)

## 2021-02-03 VITALS
HEART RATE: 82 BPM | RESPIRATION RATE: 18 BRPM | SYSTOLIC BLOOD PRESSURE: 101 MMHG | TEMPERATURE: 99 F | OXYGEN SATURATION: 98 % | DIASTOLIC BLOOD PRESSURE: 63 MMHG

## 2021-02-03 LAB
BASOPHILS # BLD AUTO: 0.04 K/UL — SIGNIFICANT CHANGE UP (ref 0–0.2)
BASOPHILS NFR BLD AUTO: 0.3 % — SIGNIFICANT CHANGE UP (ref 0–2)
EOSINOPHIL # BLD AUTO: 0.08 K/UL — SIGNIFICANT CHANGE UP (ref 0–0.5)
EOSINOPHIL NFR BLD AUTO: 0.7 % — SIGNIFICANT CHANGE UP (ref 0–6)
HCT VFR BLD CALC: 24 % — LOW (ref 34.5–45)
HGB BLD-MCNC: 7.4 G/DL — LOW (ref 11.5–15.5)
IMM GRANULOCYTES NFR BLD AUTO: 1.3 % — SIGNIFICANT CHANGE UP (ref 0–1.5)
LYMPHOCYTES # BLD AUTO: 1.83 K/UL — SIGNIFICANT CHANGE UP (ref 1–3.3)
LYMPHOCYTES # BLD AUTO: 15.3 % — SIGNIFICANT CHANGE UP (ref 13–44)
MCHC RBC-ENTMCNC: 23.9 PG — LOW (ref 27–34)
MCHC RBC-ENTMCNC: 30.8 GM/DL — LOW (ref 32–36)
MCV RBC AUTO: 77.4 FL — LOW (ref 80–100)
MONOCYTES # BLD AUTO: 1.44 K/UL — HIGH (ref 0–0.9)
MONOCYTES NFR BLD AUTO: 12 % — SIGNIFICANT CHANGE UP (ref 2–14)
NEUTROPHILS # BLD AUTO: 8.45 K/UL — HIGH (ref 1.8–7.4)
NEUTROPHILS NFR BLD AUTO: 70.4 % — SIGNIFICANT CHANGE UP (ref 43–77)
NRBC # BLD: 0 /100 WBCS — SIGNIFICANT CHANGE UP (ref 0–0)
PLATELET # BLD AUTO: 196 K/UL — SIGNIFICANT CHANGE UP (ref 150–400)
RBC # BLD: 3.1 M/UL — LOW (ref 3.8–5.2)
RBC # FLD: 15.4 % — HIGH (ref 10.3–14.5)
WBC # BLD: 11.99 K/UL — HIGH (ref 3.8–10.5)
WBC # FLD AUTO: 11.99 K/UL — HIGH (ref 3.8–10.5)

## 2021-02-03 PROCEDURE — 59050 FETAL MONITOR W/REPORT: CPT

## 2021-02-03 PROCEDURE — 85027 COMPLETE CBC AUTOMATED: CPT

## 2021-02-03 PROCEDURE — 86769 SARS-COV-2 COVID-19 ANTIBODY: CPT

## 2021-02-03 PROCEDURE — 85025 COMPLETE CBC W/AUTO DIFF WBC: CPT

## 2021-02-03 PROCEDURE — 36415 COLL VENOUS BLD VENIPUNCTURE: CPT

## 2021-02-03 PROCEDURE — 88307 TISSUE EXAM BY PATHOLOGIST: CPT

## 2021-02-03 PROCEDURE — C1889: CPT

## 2021-02-03 PROCEDURE — C1765: CPT

## 2021-02-03 RX ORDER — FERROUS SULFATE 325(65) MG
1 TABLET ORAL
Qty: 60 | Refills: 0
Start: 2021-02-03

## 2021-02-03 RX ORDER — DOCUSATE SODIUM 100 MG
1 CAPSULE ORAL
Qty: 30 | Refills: 0
Start: 2021-02-03

## 2021-02-03 RX ORDER — IBUPROFEN 200 MG
1 TABLET ORAL
Qty: 20 | Refills: 0
Start: 2021-02-03

## 2021-02-03 RX ADMIN — Medication 975 MILLIGRAM(S): at 02:51

## 2021-02-03 RX ADMIN — Medication 600 MILLIGRAM(S): at 11:07

## 2021-02-03 RX ADMIN — Medication 325 MILLIGRAM(S): at 11:08

## 2021-02-03 RX ADMIN — Medication 600 MILLIGRAM(S): at 05:31

## 2021-02-03 RX ADMIN — SIMETHICONE 80 MILLIGRAM(S): 80 TABLET, CHEWABLE ORAL at 11:07

## 2021-02-03 RX ADMIN — Medication 1 MILLIGRAM(S): at 11:08

## 2021-02-03 RX ADMIN — Medication 975 MILLIGRAM(S): at 09:00

## 2021-02-03 RX ADMIN — ENOXAPARIN SODIUM 40 MILLIGRAM(S): 100 INJECTION SUBCUTANEOUS at 04:58

## 2021-02-03 RX ADMIN — MAGNESIUM HYDROXIDE 30 MILLILITER(S): 400 TABLET, CHEWABLE ORAL at 11:08

## 2021-02-03 RX ADMIN — Medication 1 TABLET(S): at 11:08

## 2021-02-03 RX ADMIN — IRON SUCROSE 210 MILLIGRAM(S): 20 INJECTION, SOLUTION INTRAVENOUS at 11:30

## 2021-02-03 NOTE — PROGRESS NOTE ADULT - ASSESSMENT
A/P: POD #2 s/p scheduled rpt c/s; ELMO; Acute on chronic blood loss anemia; asymptomatic 
A/P: POD #1 s/p sched rpt c/s w ELMO acute on chronic blood loss anemia; asymptomatic

## 2021-02-03 NOTE — PROGRESS NOTE ADULT - PROBLEM SELECTOR PLAN 1
-d/c home   -take iron, folic acid, vitamin C, and prenatal vitamins. eat iron fortified food   -instructions verbalized  -follow up in 1-2weeks in office for incision check  -d/w dr Alves
-Pain management as needed  -OOB and ambulate  -f/u void  -take iron, folic acid, vitamin C, and prenatal vitamins. eat iron fortified food   -Advance diet to regular  -Encourage breastfeeding   -d/w Dr Alves

## 2021-02-03 NOTE — PROGRESS NOTE ADULT - SUBJECTIVE AND OBJECTIVE BOX
"Requested Prescriptions   Pending Prescriptions Disp Refills     norgestimate-ethinyl estradiol (SPRINTEC 28) 0.25-35 MG-MCG tablet        Last Written Prescription Date:  7.16.19  Last Fill Quantity: 84 tablet,  # refills: 0   Last office visit: 8/7/2018 with prescribing provider:                 Future Office Visit:   Next 5 appointments (look out 90 days)    Sep 03, 2019 11:00 AM CDT  Dean Garcia with BERNICE Mott CNP  [facility] [address] [facility] [address]  [phone]            84 tablet 0     Sig: Take 1 tablet by mouth daily       Contraceptives Protocol Failed - 8/24/2019  8:07 PM        Failed - Recent (12 mo) or future (30 days) visit within the authorizing provider's specialty     Patient had office visit in the last 12 months or has a visit in the next 30 days with authorizing provider or within the authorizing provider's specialty.  See \"Patient Info\" tab in inbasket, or \"Choose Columns\" in Meds & Orders section of the refill encounter.              Passed - Patient is not a current smoker if age is 35 or older        Passed - Medication is active on med list        Passed - No active pregnancy on record        Passed - No positive pregnancy test in past 12 months        "
Too soon to refill. Just sent for 3 month supply on 7/16/19.  Cat Cifuentes, ELVISN, RN  [facility]    
POD#1  Pt seen at bedside, just relieved pain relief.  Denies SOB, dyspnea, palpitations, + void and flatus and tolerating regular diet  Vital Signs Last 24 Hrs  T(C): 37.2 (02 Feb 2021 14:27), Max: 37.4 (02 Feb 2021 01:30)  T(F): 98.9 (02 Feb 2021 14:27), Max: 99.3 (02 Feb 2021 01:30)  HR: 84 (02 Feb 2021 14:27) (78 - 94)  BP: 98/61 (02 Feb 2021 14:27) (98/61 - 113/65)  BP(mean): 69 (01 Feb 2021 19:10) (60 - 71)  RR: 17 (02 Feb 2021 14:27) (16 - 24)  SpO2: 97% (02 Feb 2021 14:27) (97% - 100%)  HR: S1S2 RRR  Lungs CTA B/L  Abdomen soft, NT, + BS  Ext neg crystal's                        7.6    11.52 )-----------( 190      ( 02 Feb 2021 06:44 )             24.6   A/P: POD# 1 S/P repeat c/section hx antiphospholipid syndrome and anemia,  Acute blood loss anemia  Stable  continue lovenox  S/P IV venofer  Continue routine post op care  Encourage ambulation    
Patient seen at bedside resting comfortably offers no new complaints. not yet ambulating, mirza just removed no void spontaneously yet.  + flatus;  no bm; tolerating clr liq diet. both breast and bottle feeding. Denies HA, blurry vision or epigastric pain, CP, SOB, N/V/D,  dizziness, palpitations, worsening vaginal bleeding.    Vital Signs Last 24 Hrs  T(C): 36.6 (02 Feb 2021 06:20), Max: 37.4 (02 Feb 2021 01:30)  T(F): 97.9 (02 Feb 2021 06:20), Max: 99.3 (02 Feb 2021 01:30)  HR: 78 (02 Feb 2021 06:20) (75 - 88)  BP: 113/65 (02 Feb 2021 06:20) (100/58 - 115/55)  BP(mean): 69 (01 Feb 2021 19:10) (60 - 79)  RR: 17 (02 Feb 2021 06:20) (16 - 24)  SpO2: 99% (02 Feb 2021 06:20) (98% - 100%)    Gen: A&O x 3, NAD  Chest: CTA B/L  Cardiac: S1,S2  RRR  Breast: Soft, nontender, nonengorged  Abdomen: +BS; soft; Nontender, nondistended; dressing removed incision C/D/I steri strips in place  Gyn: minimal lochia   Extremities: Nontender, venodynes in place                          7.6    11.52 )-----------( 190      ( 02 Feb 2021 06:44 )             24.6       
Patient seen at bedside resting comfortably offers no new complaints. + Ambulation, + void without difficulty, + flatus;  + bm;  tolerating regular diet. both breastfeeding and bottle feeding. Denies HA, blurry vision or epigastric pain, CP, SOB, N/V/D,  dizziness, palpitations, worsening vaginal bleeding.     Vital Signs Last 24 Hrs  T(C): 37.2 (03 Feb 2021 05:54), Max: 37.4 (02 Feb 2021 18:19)  T(F): 98.9 (03 Feb 2021 05:54), Max: 99.3 (02 Feb 2021 18:19)  HR: 82 (03 Feb 2021 05:54) (82 - 94)  BP: 101/63 (03 Feb 2021 05:54) (98/61 - 104/54)  BP(mean): --  RR: 18 (03 Feb 2021 05:54) (16 - 18)  SpO2: 98% (03 Feb 2021 05:54) (97% - 98%)    Gen: A&O x 3, NAD  Chest: CTA B/L  Cardiac: S1,S2  RRR  Breast: Soft, nontender, nonengorged  Abdomen: +BS; soft; Nontender, nondistended, Incision C/D/I steri strips in place   Gyn: Minimal lochia  Extremities: Nontender, DTRS 2+, no worsening edema                          7.4    11.99 )-----------( 196      ( 03 Feb 2021 07:04 )             24.0

## 2021-02-03 NOTE — PROGRESS NOTE ADULT - PROBLEM SELECTOR PLAN 2
take iron, folic acid, vitamin C, and prenatal vitamins. eat iron fortified food
-d/c home   -take iron, folic acid, vitamin C, and prenatal vitamins. eat iron fortified food   -instructions verbalized  -follow up in 1-2weeks in office for incision check  -d/w dr Alves

## 2021-02-10 LAB — SURGICAL PATHOLOGY STUDY: SIGNIFICANT CHANGE UP

## 2021-05-09 ENCOUNTER — EMERGENCY (EMERGENCY)
Facility: HOSPITAL | Age: 36
LOS: 1 days | Discharge: HOME | End: 2021-05-09
Attending: EMERGENCY MEDICINE | Admitting: EMERGENCY MEDICINE
Payer: SELF-PAY

## 2021-05-09 VITALS
HEIGHT: 63 IN | TEMPERATURE: 97 F | SYSTOLIC BLOOD PRESSURE: 135 MMHG | DIASTOLIC BLOOD PRESSURE: 80 MMHG | RESPIRATION RATE: 18 BRPM | OXYGEN SATURATION: 100 % | HEART RATE: 75 BPM

## 2021-05-09 DIAGNOSIS — Z98.891 HISTORY OF UTERINE SCAR FROM PREVIOUS SURGERY: Chronic | ICD-10-CM

## 2021-05-09 LAB
ALBUMIN SERPL ELPH-MCNC: 4.2 G/DL — SIGNIFICANT CHANGE UP (ref 3.3–5)
ALP SERPL-CCNC: 92 U/L — SIGNIFICANT CHANGE UP (ref 40–120)
ALT FLD-CCNC: 11 U/L — SIGNIFICANT CHANGE UP (ref 4–33)
ANION GAP SERPL CALC-SCNC: 11 MMOL/L — SIGNIFICANT CHANGE UP (ref 7–14)
AST SERPL-CCNC: 12 U/L — SIGNIFICANT CHANGE UP (ref 4–32)
BASOPHILS # BLD AUTO: 0.05 K/UL — SIGNIFICANT CHANGE UP (ref 0–0.2)
BASOPHILS NFR BLD AUTO: 0.6 % — SIGNIFICANT CHANGE UP (ref 0–2)
BILIRUB SERPL-MCNC: <0.2 MG/DL — SIGNIFICANT CHANGE UP (ref 0.2–1.2)
BUN SERPL-MCNC: 14 MG/DL — SIGNIFICANT CHANGE UP (ref 7–23)
CALCIUM SERPL-MCNC: 9.2 MG/DL — SIGNIFICANT CHANGE UP (ref 8.4–10.5)
CHLORIDE SERPL-SCNC: 103 MMOL/L — SIGNIFICANT CHANGE UP (ref 98–107)
CO2 SERPL-SCNC: 25 MMOL/L — SIGNIFICANT CHANGE UP (ref 22–31)
CREAT SERPL-MCNC: 0.86 MG/DL — SIGNIFICANT CHANGE UP (ref 0.5–1.3)
D DIMER BLD IA.RAPID-MCNC: <150 NG/ML DDU — SIGNIFICANT CHANGE UP
EOSINOPHIL # BLD AUTO: 0.1 K/UL — SIGNIFICANT CHANGE UP (ref 0–0.5)
EOSINOPHIL NFR BLD AUTO: 1.1 % — SIGNIFICANT CHANGE UP (ref 0–6)
GLUCOSE SERPL-MCNC: 95 MG/DL — SIGNIFICANT CHANGE UP (ref 70–99)
HCT VFR BLD CALC: 37.1 % — SIGNIFICANT CHANGE UP (ref 34.5–45)
HGB BLD-MCNC: 10.4 G/DL — LOW (ref 11.5–15.5)
IANC: 4.86 K/UL — SIGNIFICANT CHANGE UP (ref 1.5–8.5)
IMM GRANULOCYTES NFR BLD AUTO: 0.3 % — SIGNIFICANT CHANGE UP (ref 0–1.5)
LYMPHOCYTES # BLD AUTO: 3.22 K/UL — SIGNIFICANT CHANGE UP (ref 1–3.3)
LYMPHOCYTES # BLD AUTO: 35.9 % — SIGNIFICANT CHANGE UP (ref 13–44)
MCHC RBC-ENTMCNC: 21.7 PG — LOW (ref 27–34)
MCHC RBC-ENTMCNC: 28 GM/DL — LOW (ref 32–36)
MCV RBC AUTO: 77.3 FL — LOW (ref 80–100)
MONOCYTES # BLD AUTO: 0.7 K/UL — SIGNIFICANT CHANGE UP (ref 0–0.9)
MONOCYTES NFR BLD AUTO: 7.8 % — SIGNIFICANT CHANGE UP (ref 2–14)
NEUTROPHILS # BLD AUTO: 4.86 K/UL — SIGNIFICANT CHANGE UP (ref 1.8–7.4)
NEUTROPHILS NFR BLD AUTO: 54.3 % — SIGNIFICANT CHANGE UP (ref 43–77)
NRBC # BLD: 0 /100 WBCS — SIGNIFICANT CHANGE UP
NRBC # FLD: 0 K/UL — SIGNIFICANT CHANGE UP
PLATELET # BLD AUTO: 279 K/UL — SIGNIFICANT CHANGE UP (ref 150–400)
POTASSIUM SERPL-MCNC: 4.1 MMOL/L — SIGNIFICANT CHANGE UP (ref 3.5–5.3)
POTASSIUM SERPL-SCNC: 4.1 MMOL/L — SIGNIFICANT CHANGE UP (ref 3.5–5.3)
PROT SERPL-MCNC: 7.9 G/DL — SIGNIFICANT CHANGE UP (ref 6–8.3)
RBC # BLD: 4.8 M/UL — SIGNIFICANT CHANGE UP (ref 3.8–5.2)
RBC # FLD: 15 % — HIGH (ref 10.3–14.5)
SODIUM SERPL-SCNC: 139 MMOL/L — SIGNIFICANT CHANGE UP (ref 135–145)
TROPONIN T, HIGH SENSITIVITY RESULT: <6 NG/L — SIGNIFICANT CHANGE UP
WBC # BLD: 8.96 K/UL — SIGNIFICANT CHANGE UP (ref 3.8–10.5)
WBC # FLD AUTO: 8.96 K/UL — SIGNIFICANT CHANGE UP (ref 3.8–10.5)

## 2021-05-09 PROCEDURE — 93010 ELECTROCARDIOGRAM REPORT: CPT

## 2021-05-09 PROCEDURE — 71045 X-RAY EXAM CHEST 1 VIEW: CPT | Mod: 26

## 2021-05-09 PROCEDURE — 99053 MED SERV 10PM-8AM 24 HR FAC: CPT

## 2021-05-09 PROCEDURE — 99285 EMERGENCY DEPT VISIT HI MDM: CPT | Mod: 25

## 2021-05-09 RX ORDER — ACETAMINOPHEN 500 MG
650 TABLET ORAL ONCE
Refills: 0 | Status: DISCONTINUED | OUTPATIENT
Start: 2021-05-09 | End: 2021-05-09

## 2021-05-09 RX ORDER — ASPIRIN/CALCIUM CARB/MAGNESIUM 324 MG
324 TABLET ORAL ONCE
Refills: 0 | Status: DISCONTINUED | OUTPATIENT
Start: 2021-05-09 | End: 2021-05-09

## 2021-05-09 RX ORDER — ASPIRIN/CALCIUM CARB/MAGNESIUM 324 MG
243 TABLET ORAL ONCE
Refills: 0 | Status: COMPLETED | OUTPATIENT
Start: 2021-05-09 | End: 2021-05-09

## 2021-05-09 RX ADMIN — Medication 243 MILLIGRAM(S): at 01:17

## 2021-05-09 NOTE — ED PROVIDER NOTE - NSFOLLOWUPCLINICS_GEN_ALL_ED_FT
[facility]  Cardiology  [address] [place] [address]  Phone: [phone]  Fax:

## 2021-05-09 NOTE — ED PROVIDER NOTE - OBJECTIVE STATEMENT
36F hx of C/S in 02/2021 presenting with sharp left-sided chest pain that started around 5-6 hours PTA. Patient states she was at rest when she developed pain. Non-radiating. Worse with exertion and deep inspiration. no associated diaphoresis, nausea, vomiting, dyspnea, fever, URI symptoms, leg pain/swelling/redness. No recent travel. No hx of smoking.

## 2021-05-09 NOTE — ED PROVIDER NOTE - CLINICAL SUMMARY MEDICAL DECISION MAKING FREE TEXT BOX
36F hx of C/S in 02/2021 presenting with pleuritic left-sided chest pain that started around 5-6 hours PTA. On exam, appears well clinically, VS wnl, normal cardiopulmonary exam. EKG showing anterior lead TWI. No old priors. Will check labs, trop, DD, CXR, will give ASA, disposition pending work-up and response to treatment.

## 2021-05-09 NOTE — ED PROVIDER NOTE - ATTENDING CONTRIBUTION TO CARE
37 y/o F with h/o alpha-thalassemia not on meds here with left sided chest pain.  Pt reports pain began around 6pm while she was lying down.  She states she was lying in an awkward position with her baby on her lap and was eating at the time.  The pain radiated from under left breast throughout left ant chest.  Pain is constant but worse with any movement and deep inspiration.  No associated fever, chills, cough, sob, nausea, back pain.  Pt reports having abd pain prior to onset of chest pain that was brief and resolved.  Pt is currently menstruating, approx 4 months post partum.  Well appearing, lying comfortably in stretcher, awake and alert, nontoxic.  VSS.  Lungs cta bl.  Cards nl S1/S2, RRR, no MRG.  Abd soft ntnd.  No pedal edema or calf tenderness. 35 y/o F with h/o alpha-thalassemia not on meds here with left sided chest pain.  Pt reports pain began around 6pm while she was lying down.  She states she was lying in an awkward position with her baby on her lap and was eating at the time.  The pain radiated from under left breast throughout left ant chest.  Pain is constant but worse with any movement and deep inspiration.  No associated fever, chills, cough, sob, nausea, back pain.  Pt reports having abd pain prior to onset of chest pain that was brief and resolved.  Pt is currently menstruating, approx 4 months post partum.  Well appearing, lying comfortably in stretcher, awake and alert, nontoxic.  AF/VSS.  Lungs cta bl.  Cards nl S1/S2, RRR, no MRG.  Abd soft ntnd.  No pedal edema or calf tenderness.  Plan for ekg, labs incl trop, dimer, cxr, asa/tylenol, reassess - consider PE given pleuritic component of pain, lower suspicion for acs in young pt without risk factors but will obtain trop, poss infectious/pna, r/o ptx.

## 2021-05-09 NOTE — ED PROVIDER NOTE - PATIENT PORTAL LINK FT
You can access the FollowMyHealth Patient Portal offered by [facility] by registering at the following website: [email or web address]. By joining [facility]’s FollowMyHealth portal, you will also be able to view your health information using other applications (apps) compatible with our system.

## 2021-05-09 NOTE — ED PROVIDER NOTE - NS ED ROS FT
GENERAL: no fever, chills, fatigue, weight loss, night sweats  HEENT: no eye pain, discharge, conjunctivitis, ear pain, hearing loss, rhinorrhea, congestion, throat pain  CARDIAC: + CP  PULM: no dyspnea, wheezing  GI: no abdominal pain, nausea, vomiting, diarrhea, constipation, melena, hematochezia  : no urinary dysuria, frequency, incontinence, hematuria  NEURO: no headache, changes in vision, motor weakness, sensory changes  MSK: no joint pain, joint swelling, myalgias  SKIN: no rashes  HEME: no active bleeding, excessive bruising

## 2021-05-09 NOTE — ED PROVIDER NOTE - PHYSICAL EXAMINATION
GENERAL: non-toxic appearing, in NAD  HEAD: atraumatic, normocephalic  EYES: vision grossly intact, no conjunctivitis or discharge  EARS: hearing grossly intact  NOSE: no nasal discharge, epistaxis   CARDIAC: RRR, normal S1S2,  no appreciable murmurs, no cyanosis, cap refill < 2 seconds  PULM: no respiratory distress, oxygen saturation on RA wnl, CTAB, no crackles, rales, rhonchi, or wheezing  GI: abdomen nondistended, soft, nontender, no guarding or rebound tenderness, no palpable masses  NEURO: awake and alert, follows commands, normal speech, PERRLA, EOMI, no focal motor or sensory deficits  MSK: spine appears normal, no joint swelling or erythema, no gross deformities of extremities  EXT: no peripheral edema, calf tenderness, redness or swelling  SKIN: warm, dry, and intact, no rashes  PSYCH: appropriate mood and affect

## 2021-05-09 NOTE — ED ADULT NURSE NOTE - OBJECTIVE STATEMENT
Break Coverage RN: Received pt in room 1, ambulatory, pt A&Ox4, respirations even and unlabored b/l. Pt c/o L sided rib/under breast pain, worse with exertion and inspiration. Denies SOB, dizziness, fever, chills. Abdomen soft, nondistended, nontender. Took tylenol and aspirin with no relief at home. Appears in no apparent distress. Sinus rhythm on cardiac monitor. PMHx anemia. Awaiting MD abrams. Will continue to monitor.

## 2021-05-09 NOTE — ED PROVIDER NOTE - NSFOLLOWUPINSTRUCTIONS_ED_ALL_ED_FT
Your diagnosis: chest pain    We performed labs, which were unremarkable.    Discharge instructions:    - Please follow up with your Primary Care Doctor in 2-3 days.    - Tylenol up to 650 mg every 8 hours as needed for pain and/or Motrin up to 600 mg every 6 hours as needed for pain.     - Be sure to return to the ED if you develop new or worsening symptoms. Specific signs and symptoms to be vigilant of: worsening or persistent chest pain, shortness of breath. Your diagnosis: chest pain    We performed labs, which were unremarkable.    Discharge instructions:    - Please follow up with a cardiologist urgently.    - Tylenol up to 650 mg every 8 hours as needed for pain and/or Motrin up to 600 mg every 6 hours as needed for pain.     - Be sure to return to the ED if you develop new or worsening symptoms. Specific signs and symptoms to be vigilant of: worsening or persistent chest pain, shortness of breath.

## 2021-05-09 NOTE — ED ADULT TRIAGE NOTE - CHIEF COMPLAINT QUOTE
Pt c/o left sided rib pain radiating to chest, worse with inspiration. Unrelieved with Tylenol, aspirin at home. PMHX anemia. Denies sob, fever, chills, injury.

## 2021-07-10 NOTE — PATIENT PROFILE OB - BREAST MILK PROVIDES PROTECTION AGAINST INFECTION
Calorie Count  Intake recorded for: 7/9  Total Kcals: 344 Total Protein: 14g  Kcals from Hospital Food: 344   Protein: 14g  Kcals from Outside Food (average):0  Protein: 0g  # Meals Ordered from Kitchen: 1 meal  # Meals Recorded: 1 meal (100% chicken tenders, fran mist)  # Supplements Recorded: No intake recorded     Statement Selected

## 2022-11-22 NOTE — DISCHARGE NOTE OB - IF LEAKING COLOSTRUM, UTILIZE BREAST PADS.  DO NOT USE VAGINAL MINI PADS FOR THIS PURPOSE
laparoscopic sleeve gastrectomy with upper endoscopy, covid swab 12/2-8 am, preop instructions given, to for medical clearance Statement Selected

## 2023-04-13 NOTE — ED PROVIDER NOTE - NSFOLLOWUPINSTRUCTIONS_ED_ALL_ED_FT
Outgoing call to pt to discuss copay of Dalfampridine and benefit percentages and costs. Informed pt of [facility] at a cheaper price. Pt opted to fill with [facility]. Advised pt he will need to [facility] account [facility] prior to [facility] being able to route rx to pharmacy.    [facility] will continue to follow up   Make sure to see and schedule a D&C with your OBGYN Dr Oconnell.  Call today to schedule.      Continue to take ibuprofen, tylenol and percocet as you have at home    Return for any heavy bleeding or worsening pain

## 2025-03-19 NOTE — ED ADULT TRIAGE NOTE - ISOLATION TYPE:
Orders:    DULoxetine (CYMBALTA) 30 mg delayed release capsule; Take 1 capsule (30 mg total) by mouth daily     None
